# Patient Record
Sex: MALE | Race: OTHER | NOT HISPANIC OR LATINO
[De-identification: names, ages, dates, MRNs, and addresses within clinical notes are randomized per-mention and may not be internally consistent; named-entity substitution may affect disease eponyms.]

---

## 2020-09-03 ENCOUNTER — TRANSCRIPTION ENCOUNTER (OUTPATIENT)
Age: 25
End: 2020-09-03

## 2021-07-14 ENCOUNTER — APPOINTMENT (OUTPATIENT)
Dept: UROLOGY | Facility: CLINIC | Age: 26
End: 2021-07-14
Payer: COMMERCIAL

## 2021-07-14 VITALS
BODY MASS INDEX: 28.93 KG/M2 | WEIGHT: 180 LBS | DIASTOLIC BLOOD PRESSURE: 81 MMHG | HEART RATE: 76 BPM | RESPIRATION RATE: 17 BRPM | HEIGHT: 66 IN | SYSTOLIC BLOOD PRESSURE: 133 MMHG | TEMPERATURE: 97.1 F

## 2021-07-14 DIAGNOSIS — Z12.5 ENCOUNTER FOR SCREENING FOR MALIGNANT NEOPLASM OF PROSTATE: ICD-10-CM

## 2021-07-14 PROCEDURE — 99214 OFFICE O/P EST MOD 30 MIN: CPT

## 2021-07-14 PROCEDURE — 99072 ADDL SUPL MATRL&STAF TM PHE: CPT

## 2021-07-20 ENCOUNTER — TRANSCRIPTION ENCOUNTER (OUTPATIENT)
Age: 26
End: 2021-07-20

## 2021-07-22 ENCOUNTER — NON-APPOINTMENT (OUTPATIENT)
Age: 26
End: 2021-07-22

## 2021-07-22 DIAGNOSIS — R82.89 OTHER ABNRM FNDNGS ON CYTOLOGICAL: ICD-10-CM

## 2021-07-22 LAB
ALBUMIN SERPL ELPH-MCNC: 4.9 G/DL
ALP BLD-CCNC: 107 U/L
ALT SERPL-CCNC: 55 U/L
ANDROST SERPL-MCNC: 94 NG/DL
ANDROSTANOLONE SERPL-MCNC: 34 NG/DL
ANION GAP SERPL CALC-SCNC: 13 MMOL/L
APPEARANCE: CLEAR
AST SERPL-CCNC: 88 U/L
BACTERIA UR CULT: NORMAL
BACTERIA: NEGATIVE
BASOPHILS # BLD AUTO: 0.02 K/UL
BASOPHILS NFR BLD AUTO: 0.3 %
BILIRUB SERPL-MCNC: 0.6 MG/DL
BILIRUBIN URINE: NEGATIVE
BLOOD URINE: NEGATIVE
BUN SERPL-MCNC: 8 MG/DL
CALCIUM SERPL-MCNC: 10.5 MG/DL
CHLORIDE SERPL-SCNC: 100 MMOL/L
CO2 SERPL-SCNC: 28 MMOL/L
COLOR: YELLOW
CREAT SERPL-MCNC: 0.89 MG/DL
DHEA SERPL-MCNC: 405 NG/DL
DHEA-S SERPL-MCNC: 341 UG/DL
EOSINOPHIL # BLD AUTO: 0.11 K/UL
EOSINOPHIL NFR BLD AUTO: 1.7 %
ESTRADIOL SERPL-MCNC: 22 PG/ML
ESTROGEN SERPL-MCNC: 73 PG/ML
FSH SERPL-MCNC: 2.6 IU/L
GLUCOSE QUALITATIVE U: NEGATIVE
GLUCOSE SERPL-MCNC: 89 MG/DL
HAV IGM SER QL: NONREACTIVE
HBV CORE IGM SER QL: NONREACTIVE
HBV SURFACE AG SER QL: NONREACTIVE
HCT VFR BLD CALC: 50.8 %
HCV AB SER QL: NONREACTIVE
HCV S/CO RATIO: 0.1 S/CO
HGB BLD-MCNC: 16.6 G/DL
HIV1+2 AB SPEC QL IA.RAPID: NONREACTIVE
HSV 1+2 IGG SER IA-IMP: NEGATIVE
HSV 1+2 IGG SER IA-IMP: POSITIVE
HSV1 IGG SER QL: 41.2 INDEX
HSV1 IGM SER QL: NORMAL TITER
HSV2 AB FLD-ACNC: NORMAL TITER
HSV2 IGG SER QL: 0.1 INDEX
HYALINE CASTS: 0 /LPF
IMM GRANULOCYTES NFR BLD AUTO: 0.3 %
KETONES URINE: NEGATIVE
LEUKOCYTE ESTERASE URINE: NEGATIVE
LH SERPL-ACNC: 2.7 IU/L
LYMPHOCYTES # BLD AUTO: 2.02 K/UL
LYMPHOCYTES NFR BLD AUTO: 30.4 %
MAN DIFF?: NORMAL
MCHC RBC-ENTMCNC: 27.5 PG
MCHC RBC-ENTMCNC: 32.7 GM/DL
MCV RBC AUTO: 84.1 FL
MICROSCOPIC-UA: NORMAL
MONOCYTES # BLD AUTO: 0.42 K/UL
MONOCYTES NFR BLD AUTO: 6.3 %
NEUTROPHILS # BLD AUTO: 4.05 K/UL
NEUTROPHILS NFR BLD AUTO: 61 %
NITRITE URINE: NEGATIVE
PH URINE: 7
PLATELET # BLD AUTO: 214 K/UL
POTASSIUM SERPL-SCNC: 4.8 MMOL/L
PROGEST SERPL-MCNC: 0.2 NG/ML
PROLACTIN SERPL-MCNC: 8 NG/ML
PROT SERPL-MCNC: 7.2 G/DL
PROTEIN URINE: NORMAL
PSA SERPL-MCNC: 1.24 NG/ML
PSA SERPL-MCNC: 1.29 NG/ML
RBC # BLD: 6.04 M/UL
RBC # FLD: 12.8 %
RED BLOOD CELLS URINE: 0 /HPF
SHBG SERPL-SCNC: 18.5 NMOL/L
SODIUM SERPL-SCNC: 141 MMOL/L
SPECIFIC GRAVITY URINE: 1.02
SQUAMOUS EPITHELIAL CELLS: 0 /HPF
T PALLIDUM AB SER QL IA: NEGATIVE
TESTOST BND SERPL-MCNC: 11.2 PG/ML
TESTOSTERONE TOTAL S: 320 NG/DL
TSH SERPL-ACNC: 2.99 UIU/ML
URINE CYTOLOGY: NORMAL
UROBILINOGEN URINE: ABNORMAL
WBC # FLD AUTO: 6.64 K/UL
WHITE BLOOD CELLS URINE: 0 /HPF

## 2021-07-23 NOTE — ASSESSMENT
[FreeTextEntry1] : 07/14/2021:  is a 27 y/o M that presents today for an initial evaluation of poor quality erections. He started noticing poor erections about 3 years ago but has put off coming in. He is able to get firm enough for penetration and he is able to reach orgasm, but the size is no where near where he used to be. His erect length used to be about 7 inches but has since gone down to 5.5 inches. He originally had a curvature downward that has since straightened out. He believes he often feels plaques when he is erect. Does not curve left or right. C/o a lot of pre ejaculation, clear in color. 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 He also has noticed that when his penis is not erect, it is coiled often. Libido is good but when he thinks about his problems it tends to decrease. Has had unprotected sex with multiple partners who he trusts and uses protection with people he doesn't. He hasn't been tested for STD's or STI's recently. Vitality is pretty good and he has recently returned to the gym. Urination is good. Denies nocturia, pushing, straining, gross hematuria, burning, urinary urgency and frequency. Currently does not take any medications. No known drug allergies. +PMHx of mild ulcerative colitis. Last colonoscopy was 3 years ago. Denies ever smoking or using tobacco products. Occasional social drinker. +FHx of prostate cancer (father, had prostatectomy 3 years ago), kidney stones (father), and diabetes. Denies FHx of sarcoidosis. Denies ever using steroids. 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 \par \par PE: Penis has natural curvature to the left. Circumcised. Urethral meatus is normal. Normal mucosa within. No exudate. Appearance is that of a normal circumcised penis. No plaques felt. Appearance of skin of the penile shaft if normal. Circumcision scar and glans is normal. Left testes is ascended, about 12 cc. Normal position of the scrotum. Right testes is about 13 cc. Normal texture, non tender, and without masses of both testes. Right and left cremasteric reflex is active. \par \par Blood work today included PSA, SHBG, Syphilis screen, testosterone free and total, TSH, androstenedione, CBC with diff, CMP, dehydroepiandrosterone serum, dehydroepiandrosterone -sulfate serum, dihydrotestosterone, estradiol, estrogen total, FSH, HIV AG/AB screen, progesterone, prolactin, Herpes simplex(1,2) IgG and IgM, LH, and hepatitis acute panel. \par \par Strongly recommended the patient use latex condoms when having sex to protect him against STD's and STI's. \par \par The patient produced a urine sample which will be sent for urinalysis, urine cytology, and urine culture. \par \par Patient will RTO in 2 weeks to review the results of his blood work. \par \par Preparation, in person office visit, and coordination of care: 45 minutes.

## 2021-07-23 NOTE — HISTORY OF PRESENT ILLNESS
[FreeTextEntry1] : 07/14/2021:  is a 27 y/o M that presents today for an initial evaluation of poor quality erections. He started noticing poor erections about 3 years ago but has put off coming in. He is able to get firm enough for penetration and he is able to reach orgasm, but the size is no where near where he used to be. His erect length used to be about 7 inches but has since gone down to 5.5 inches. He originally had a curvature downward that has since straightened out. He believes he often feels plaques when he is erect. Does not curve left or right. C/o a lot of pre ejaculation, clear in color. 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 He also has noticed that when his penis is not erect, it is coiled often. Libido is good but when he thinks about his problems it tends to decrease. Has had unprotected sex with multiple partners who he trusts and uses protection with people he doesn't. He hasn't been tested for STD's or STI's recently. Vitality is pretty good and he has recently returned to the gym. Urination is good. Denies nocturia, pushing, straining, gross hematuria, burning, urinary urgency and frequency. Currently does not take any medications. No known drug allergies. +PMHx of mild ulcerative colitis. Last colonoscopy was 3 years ago. Denies ever smoking or using tobacco products. Occasional social drinker. +FHx of prostate cancer (father, had prostatectomy 3 years ago), kidney stones (father), and diabetes. Denies FHx of sarcoidosis. Denies ever using steroids. 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 \par

## 2021-07-23 NOTE — HISTORY OF PRESENT ILLNESS
[FreeTextEntry1] : 07/14/2021:  is a 27 y/o M that presents today for an initial evaluation of poor quality erections. He started noticing poor erections about 3 years ago but has put off coming in. He is able to get firm enough for penetration and he is able to reach orgasm, but the size is no where near where he used to be. His erect length used to be about 7 inches but has since gone down to 5.5 inches. He originally had a curvature downward that has since straightened out. He believes he often feels plaques when he is erect. Does not curve left or right. C/o a lot of pre ejaculation, clear in color. 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 He also has noticed that when his penis is not erect, it is coiled often. Libido is good but when he thinks about his problems it tends to decrease. Has had unprotected sex with multiple partners who he trusts and uses protection with people he doesn't. He hasn't been tested for STD's or STI's recently. Vitality is pretty good and he has recently returned to the gym. Urination is good. Denies nocturia, pushing, straining, gross hematuria, burning, urinary urgency and frequency. Currently does not take any medications. No known drug allergies. +PMHx of mild ulcerative colitis. Last colonoscopy was 3 years ago. Denies ever smoking or using tobacco products. Occasional social drinker. +FHx of prostate cancer (father, had prostatectomy 3 years ago), kidney stones (father), and diabetes. Denies FHx of sarcoidosis. Denies ever using steroids. 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 \par

## 2021-07-23 NOTE — ASSESSMENT
[FreeTextEntry1] : 07/14/2021:  is a 25 y/o M that presents today for an initial evaluation of poor quality erections. He started noticing poor erections about 3 years ago but has put off coming in. He is able to get firm enough for penetration and he is able to reach orgasm, but the size is no where near where he used to be. His erect length used to be about 7 inches but has since gone down to 5.5 inches. He originally had a curvature downward that has since straightened out. He believes he often feels plaques when he is erect. Does not curve left or right. C/o a lot of pre ejaculation, clear in color. 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 He also has noticed that when his penis is not erect, it is coiled often. Libido is good but when he thinks about his problems it tends to decrease. Has had unprotected sex with multiple partners who he trusts and uses protection with people he doesn't. He hasn't been tested for STD's or STI's recently. Vitality is pretty good and he has recently returned to the gym. Urination is good. Denies nocturia, pushing, straining, gross hematuria, burning, urinary urgency and frequency. Currently does not take any medications. No known drug allergies. +PMHx of mild ulcerative colitis. Last colonoscopy was 3 years ago. Denies ever smoking or using tobacco products. Occasional social drinker. +FHx of prostate cancer (father, had prostatectomy 3 years ago), kidney stones (father), and diabetes. Denies FHx of sarcoidosis. Denies ever using steroids. 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 \par \par PE: Penis has natural curvature to the left. Circumcised. Urethral meatus is normal. Normal mucosa within. No exudate. Appearance is that of a normal circumcised penis. No plaques felt. Appearance of skin of the penile shaft if normal. Circumcision scar and glans is normal. Left testes is ascended, about 12 cc. Normal position of the scrotum. Right testes is about 13 cc. Normal texture, non tender, and without masses of both testes. Right and left cremasteric reflex is active. \par \par Blood work today included PSA, SHBG, Syphilis screen, testosterone free and total, TSH, androstenedione, CBC with diff, CMP, dehydroepiandrosterone serum, dehydroepiandrosterone -sulfate serum, dihydrotestosterone, estradiol, estrogen total, FSH, HIV AG/AB screen, progesterone, prolactin, Herpes simplex(1,2) IgG and IgM, LH, and hepatitis acute panel. \par \par Strongly recommended the patient use latex condoms when having sex to protect him against STD's and STI's. \par \par The patient produced a urine sample which will be sent for urinalysis, urine cytology, and urine culture. \par \par Patient will RTO in 2 weeks to review the results of his blood work. \par \par Preparation, in person office visit, and coordination of care: 45 minutes.

## 2021-07-23 NOTE — PHYSICAL EXAM
[General Appearance - Well Developed] : well developed [General Appearance - Well Nourished] : well nourished [Normal Appearance] : normal appearance [Well Groomed] : well groomed [General Appearance - In No Acute Distress] : no acute distress [Edema] : no peripheral edema [Respiration, Rhythm And Depth] : normal respiratory rhythm and effort [Exaggerated Use Of Accessory Muscles For Inspiration] : no accessory muscle use [Abdomen Soft] : soft [Abdomen Tenderness] : non-tender [Abdomen Hernia] : no hernia was discovered [Costovertebral Angle Tenderness] : no ~M costovertebral angle tenderness [Normal Station and Gait] : the gait and station were normal for the patient's age [Skin Color & Pigmentation] : normal skin color and pigmentation [] : no rash [No Focal Deficits] : no focal deficits [Oriented To Time, Place, And Person] : oriented to person, place, and time [Affect] : the affect was normal [Mood] : the mood was normal [Not Anxious] : not anxious [No Palpable Adenopathy] : no palpable adenopathy [Inguinal Lymph Nodes Enlarged Bilaterally] : inguinal [FreeTextEntry1] : Penis has natural curvature to the left. Circumcised. Urethral meatus is normal. Normal mucosa within. No exudate. Appearance is that of a normal circumcised penis. No plaques felt. Appearance of skin of the penile shaft if normal. Circumcision scar and glans is normal. Left testes is ascended, about 12 cc. Normal position of the scrotum. Right testes is about 13 cc. Normal texture, non tender, and without masses of both testes. Right and left cremasteric reflex is active.

## 2021-07-23 NOTE — ADDENDUM
[FreeTextEntry1] : This note was authored by Mireille Swanson working as a scribe for Dr.Gary Hitchcock. I, Dr. Duglas Hitchcock have reviewed the content of this note and confirm it is true and accurate. I personally performed the history and physical examination and made all the decisions 07/14/2021.

## 2021-07-27 ENCOUNTER — NON-APPOINTMENT (OUTPATIENT)
Age: 26
End: 2021-07-27

## 2021-07-28 ENCOUNTER — APPOINTMENT (OUTPATIENT)
Dept: UROLOGY | Facility: CLINIC | Age: 26
End: 2021-07-28

## 2021-07-29 LAB — URINE CYTOLOGY: NORMAL

## 2021-09-01 ENCOUNTER — NON-APPOINTMENT (OUTPATIENT)
Age: 26
End: 2021-09-01

## 2021-09-02 ENCOUNTER — APPOINTMENT (OUTPATIENT)
Dept: UROLOGY | Facility: CLINIC | Age: 26
End: 2021-09-02
Payer: COMMERCIAL

## 2021-09-02 DIAGNOSIS — N50.9 DISORDER OF MALE GENITAL ORGANS, UNSPECIFIED: ICD-10-CM

## 2021-09-02 DIAGNOSIS — Z20.2 CONTACT WITH AND (SUSPECTED) EXPOSURE TO INFECTIONS WITH A PREDOMINANTLY SEXUAL MODE OF TRANSMISSION: ICD-10-CM

## 2021-09-02 DIAGNOSIS — Z80.42 FAMILY HISTORY OF MALIGNANT NEOPLASM OF PROSTATE: ICD-10-CM

## 2021-09-02 PROCEDURE — 99443: CPT

## 2021-09-03 PROBLEM — N50.9 DISORDER OF MALE GENITAL ORGANS: Status: ACTIVE | Noted: 2021-07-14

## 2021-09-03 PROBLEM — Z20.2 EXPOSURE TO VENEREAL DISEASE: Status: ACTIVE | Noted: 2021-07-14

## 2021-09-03 PROBLEM — Z80.42 FAMILY HISTORY OF PROSTATE CANCER: Status: ACTIVE | Noted: 2021-07-14

## 2021-09-03 NOTE — ASSESSMENT
[FreeTextEntry1] : 07/14/2021:  is a 25 y/o M that presents today for an initial evaluation of poor quality erections. He started noticing poor erections about 3 years ago but has put off coming in. He is able to get firm enough for penetration and he is able to reach orgasm, but the size is no where near where he used to be. His erect length used to be about 7 inches but has since gone down to 5.5 inches. He originally had a curvature downward that has since straightened out. He believes he often feels plaques when he is erect. Does not curve left or right. C/o a lot of pre ejaculation, clear in color. 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 He also has noticed that when his penis is not erect, it is coiled often. Libido is good but when he thinks about his problems it tends to decrease. Has had unprotected sex with multiple partners who he trusts and uses protection with people he doesn't. He hasn't been tested for STD's or STI's recently. Vitality is pretty good and he has recently returned to the gym. Urination is good. Denies nocturia, pushing, straining, gross hematuria, burning, urinary urgency and frequency. Currently does not take any medications. No known drug allergies. +PMHx of mild ulcerative colitis. Last colonoscopy was 3 years ago. Denies ever smoking or using tobacco products. Occasional social drinker. +FHx of prostate cancer (father, had prostatectomy 3 years ago), kidney stones (father), and diabetes. Denies FHx of sarcoidosis. Denies ever using steroids. 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 \par \par 09/02/2021: This visit was provided via the telephone using real-time audio technology. Dr Hitchcock was located at his office on [facility]. The patient was located at home. Attempted call at 3:13 pm, will try again in an hour. Patient tried again 5:00pm with no response. Patient reports feeling fine, but he feels that he is overusing his penis. His hormone levels were all normal from 7/14/21. His urinalysis from 7/14/21 was normal but had a higher specific gravity. His CBC from 7/14/21 showed a slightly elevated hematocrit. He notes that he has colitis. His urine cytology from 7/26/21 was normal with benign urothelial cells. His STD/STI results were normal. He inquired about vasodilation treatment for his erections.\par \par I advised him to cut back on masturbation and that there was no damage on physical exam. I told him to show his blood work to his GI. I will refer him to Dr. Oneill for further evaluation. \par \par Patient will RTO in 3 months, sooner if there is any urgent problems. \par \par Preparation, in person office visit, and coordination of care: 31 minutes.

## 2021-09-03 NOTE — ADDENDUM
[FreeTextEntry1] : I, Jaida Hilliard, acted as the sole scribe for Dr. Duglas Hitchcock on 09/02/2021.

## 2021-09-03 NOTE — HISTORY OF PRESENT ILLNESS
[FreeTextEntry1] : 07/14/2021:  is a 25 y/o M that presents today for an initial evaluation of poor quality erections. He started noticing poor erections about 3 years ago but has put off coming in. He is able to get firm enough for penetration and he is able to reach orgasm, but the size is no where near where he used to be. His erect length used to be about 7 inches but has since gone down to 5.5 inches. He originally had a curvature downward that has since straightened out. He believes he often feels plaques when he is erect. Does not curve left or right. C/o a lot of pre ejaculation, clear in color. 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 He also has noticed that when his penis is not erect, it is coiled often. Libido is good but when he thinks about his problems it tends to decrease. Has had unprotected sex with multiple partners who he trusts and uses protection with people he doesn't. He hasn't been tested for STD's or STI's recently. Vitality is pretty good and he has recently returned to the gym. Urination is good. Denies nocturia, pushing, straining, gross hematuria, burning, urinary urgency and frequency. Currently does not take any medications. No known drug allergies. +PMHx of mild ulcerative colitis. Last colonoscopy was 3 years ago. Denies ever smoking or using tobacco products. Occasional social drinker. +FHx of prostate cancer (father, had prostatectomy 3 years ago), kidney stones (father), and diabetes. Denies FHx of sarcoidosis. Denies ever using steroids. 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 \par \par 09/02/2021:

## 2022-08-22 ENCOUNTER — APPOINTMENT (OUTPATIENT)
Dept: UROLOGY | Facility: CLINIC | Age: 27
End: 2022-08-22

## 2022-08-23 ENCOUNTER — APPOINTMENT (OUTPATIENT)
Dept: UROLOGY | Facility: CLINIC | Age: 27
End: 2022-08-23

## 2022-08-23 VITALS
HEIGHT: 72 IN | TEMPERATURE: 98 F | RESPIRATION RATE: 16 BRPM | DIASTOLIC BLOOD PRESSURE: 79 MMHG | WEIGHT: 167 LBS | SYSTOLIC BLOOD PRESSURE: 121 MMHG | HEART RATE: 72 BPM | BODY MASS INDEX: 22.62 KG/M2 | OXYGEN SATURATION: 99 %

## 2022-08-23 DIAGNOSIS — F42.8 OTHER OBSESSIVE COMPULSIVE DISORDER: ICD-10-CM

## 2022-08-23 DIAGNOSIS — F42.9 OBSESSIVE-COMPULSIVE DISORDER, UNSPECIFIED: ICD-10-CM

## 2022-08-23 DIAGNOSIS — F41.9 ANXIETY DISORDER, UNSPECIFIED: ICD-10-CM

## 2022-08-23 DIAGNOSIS — N52.9 MALE ERECTILE DYSFUNCTION, UNSPECIFIED: ICD-10-CM

## 2022-08-23 PROCEDURE — 99214 OFFICE O/P EST MOD 30 MIN: CPT

## 2022-08-23 RX ORDER — TRAZODONE HYDROCHLORIDE 50 MG/1
50 TABLET ORAL
Qty: 30 | Refills: 6 | Status: ACTIVE | OUTPATIENT
Start: 2022-08-23

## 2022-08-23 RX ORDER — TADALAFIL 5 MG/1
5 TABLET ORAL
Qty: 90 | Refills: 3 | Status: ACTIVE | OUTPATIENT
Start: 2022-08-23

## 2022-08-23 RX ORDER — PAPAVER/PHENTOLAMINE/ALPROSTAD 150-5-50
150-5-50 VIAL (EA) INTRACAVERNOSAL
Qty: 1 | Refills: 6 | Status: ACTIVE | COMMUNITY
Start: 2022-08-23 | End: 1900-01-01

## 2022-08-23 RX ORDER — MESALAMINE 1000 MG/1
1000 SUPPOSITORY RECTAL
Qty: 30 | Refills: 0 | Status: ACTIVE | COMMUNITY
Start: 2022-05-05

## 2022-11-01 NOTE — HISTORY OF PRESENT ILLNESS
[FreeTextEntry1] : ED\par started to experience 2 years ago \par \par sex last time a month \par can penetrate but rigidity not as full as used to be \par was able to ejaculate inside \par \par getting erections an issue \par \par when masturbating not as hard as used to be \par \par no morning erection erection \par \par used to masturbate a lot \par sometimes few hours week \par \par a medical student \par \par no medication so far\par \par ulcerative colitis \par \par started puberty at 7th grade \par masturbated a lot \par \par few years ago started to feel that there is a bend in the penis \par ventral \par had penile pain when he had curvature - believed that he had PD \par

## 2022-11-01 NOTE — ASSESSMENT
[FreeTextEntry1] : ED\par start Cialis \par \par if fails then PDUS\par \par check T \par reassured\par \par Erectile dysfunction.\par \par Following was discussed with the patient.  Erectile dysfunction can affect him in the all ages.  Normal quality of erections depends on normal in flow of blood through the cavernosal arteries, distention of the corpora cavernosa and closure of the veins draining the penis.  I compared getting erectile dysfunction to feeling of the sink with force of being in the artery and drain being a vein.  This help patient understand physiology of erections.  Normal erection is regulated by sensory input from the penis as well as normal arousal and processing of the sexual cues.  Complex interplay between the neuroendocrine system and autonomic nervous system as well as sensory input was discussed.\par \par Most common reasons for erectile dysfunction in younger men are performance anxiety, sexual experience, lack of point of reference, and realistic expectations.  Congenital abnormalities of vessels can also be identified.  They typically present with inability to sustain erection from late teenager to early 20s.  Will call with venous leak.\par \par In older men erectile dysfunction can be due to atherosclerosis, elevated cholesterol, low testosterone, diabetes, injury to autonomic nervous system, loss of sensation, abnormal processing of sexual cues and disorder of arousal.\par \par Obesity, smoking, use of statin medication can negatively affect erectile function.\par \par Increasing exercise, healthy eating habits, getting adequate amount of sleep, all of those can be helpful in attaining normal erections.\par \par \par Evaluation of erectile dysfunction is here to work patient's needs and goals.  Typically will start with just oral medication with medication like Cialis or Viagra.  Use of those medications was explained to the patient.  Difference between on demand and daily use was discussed.  Viagra needs to be taken on empty stomach.\par \par Mechanism of action of erectile dysfunction medication was also discussed.\par \par In men who have failed to respond to oral therapy or who have inadequate response level we then moved to penile Doppler ultrasound after penile injection.\par \par Injection of vasculogenic medication, specifically combination of papaverine, phentolamine and prostaglandin E1 is necessary to achieve adequate erection during penile Doppler ultrasound.  If penile Doppler ultrasound is scheduled then Trimix prescription is sent to the pharmacy and patient is made aware that he needs to bring the medication on the day of procedure as we cannot start the medication.\par \par Risks of Trimix injection as well as penile Doppler ultrasound like prolonged erection from the cavernosal injection of Trimix or embarrassment during the penile Doppler ultrasound were discussed with the patient.\par \par Depending on the results of penile Doppler ultrasound we may recommend that the patient see his cardiologist to have cardiac evaluation especially if blood flow through the cavernosal arteries is abnormal.\par \par If venous leak was found or arteriovenous shunting was identified in we discussed with patient management of these conditions.  Specifically venous leak can be sometimes corrected by modified venous stripping using Thomas technique.  In everything fails we can place penile prosthesis however this is the last choice and last step in treating of erectile dysfunction.\par \par Use of self injection is often recommended after patient achieve adequate erection during the penile Doppler ultrasound.\par \par Correction of high hemoglobin A1c, high lipids, low testosterone and cessation of smoking are always recommended.\par \par Prescriptions were given.  Side effects were explained.  Patient will follow-up with me as scheduled.\par \par Risk of priapism was explained.\par

## 2022-11-29 LAB
TESTOST FREE SERPL-MCNC: 10 PG/ML
TESTOST SERPL-MCNC: 328 NG/DL